# Patient Record
Sex: FEMALE | Race: BLACK OR AFRICAN AMERICAN | NOT HISPANIC OR LATINO | URBAN - METROPOLITAN AREA
[De-identification: names, ages, dates, MRNs, and addresses within clinical notes are randomized per-mention and may not be internally consistent; named-entity substitution may affect disease eponyms.]

---

## 2020-10-31 ENCOUNTER — EMERGENCY (EMERGENCY)
Facility: HOSPITAL | Age: 35
LOS: 1 days | Discharge: HOME | End: 2020-10-31
Attending: INTERNAL MEDICINE | Admitting: INTERNAL MEDICINE
Payer: SELF-PAY

## 2020-10-31 VITALS
TEMPERATURE: 98 F | RESPIRATION RATE: 18 BRPM | OXYGEN SATURATION: 100 % | HEART RATE: 81 BPM | SYSTOLIC BLOOD PRESSURE: 106 MMHG | DIASTOLIC BLOOD PRESSURE: 68 MMHG

## 2020-10-31 PROCEDURE — 72100 X-RAY EXAM L-S SPINE 2/3 VWS: CPT | Mod: 26

## 2020-10-31 PROCEDURE — 72170 X-RAY EXAM OF PELVIS: CPT | Mod: 26

## 2020-10-31 PROCEDURE — 99283 EMERGENCY DEPT VISIT LOW MDM: CPT

## 2020-10-31 RX ORDER — ACETAMINOPHEN 500 MG
975 TABLET ORAL ONCE
Refills: 0 | Status: COMPLETED | OUTPATIENT
Start: 2020-10-31 | End: 2020-10-31

## 2020-10-31 RX ORDER — LIDOCAINE 4 G/100G
1 CREAM TOPICAL ONCE
Refills: 0 | Status: COMPLETED | OUTPATIENT
Start: 2020-10-31 | End: 2020-10-31

## 2020-10-31 RX ADMIN — Medication 975 MILLIGRAM(S): at 13:58

## 2020-10-31 RX ADMIN — LIDOCAINE 1 PATCH: 4 CREAM TOPICAL at 13:58

## 2020-10-31 NOTE — ED PROVIDER NOTE - CRANIAL NERVE AND PUPILLARY EXAM
5/5 strength and intact sensation bilaterally in upper and lower extremity./cranial nerves 2-12 intact

## 2020-10-31 NOTE — ED PROVIDER NOTE - ATTENDING CONTRIBUTION TO CARE
Awake, Alert, Conversant.  Resting comfortably.  Breath sounds clear in all lung fields.  Normal and regular heart rate without murmurs, rubs, or gallops.  Normal S1/S2.  Abdomen soft and nontender.  No lower extremity swelling or tenderness.  Oriented and conversant with fluent speech, moving all extremities with good strength.    Dr. Bassett: I agree with the above provided history and exam and addend/modify it as follows.  34F denies PMHx p/W lower back pain.  No red flags for dangerous etiology of pain.  Plan for analgesia, instructions regarding back care, return precautions and follow-up instructions.    I Stephen Bassett MD performed a history and physical exam of the patient and discussed their management with the resident and /or advanced care provider. I reviewed the resident and /or ACP's note and agree with the documented findings and plan of care. My medical decision making and observations are found above.

## 2020-10-31 NOTE — ED PROVIDER NOTE - PATIENT PORTAL LINK FT
You can access the FollowMyHealth Patient Portal offered by [facility] by registering at the following website: [email or web address]. By joining [facility]’s FollowMyHealth portal, you will also be able to view your health information using other applications (apps) compatible with our system.

## 2020-10-31 NOTE — ED PROVIDER NOTE - OBJECTIVE STATEMENT
35 Y/O F denies PMH or PSH states that she has been low back pain since Thursday. States she was standing up and developed sharp pain in her low back that has been persistent after getting up from a chair. Pt states she took 800MG of Ibuprofen at 1130AM without significant relief. Pt denies numbness, weakness, tingling or bowel or bladder changes. Pt is a non-smoker, denies drug or alcohol use. Pt denies any other symptoms or acute complaints.

## 2020-10-31 NOTE — ED PROVIDER NOTE - PHYSICAL EXAMINATION
Mild tenderness L paraspinal muscle. No ecchymosis or spinous process tenderness. No CVA tenderness bilaterally.

## 2020-10-31 NOTE — ED PROVIDER NOTE - CLINICAL SUMMARY MEDICAL DECISION MAKING FREE TEXT BOX
35 Y/O F denies PMH or PSH states that she has been low back pain since Thursday. States she was standing up and developed sharp pain in her low back that has been persistent after getting up from a chair. Pt is neurologically intact, no bowel or bladder changes. Plan is pain control and X-ray imaging, likely D/C with Orthopedist follow up.

## 2020-10-31 NOTE — ED PROVIDER NOTE - NSFOLLOWUPINSTRUCTIONS_ED_ALL_ED_FT
Follow up with your primary doctor and an Orthopedist. If you do not have an Orthopedist call  to schedule with an Orthopedist. Take Motrin and or Tylenol as needed for pain and you can use a Lidocaine patch as well. Advance activity as tolerated.  Continue all previously prescribed medications as directed.  Follow up with your primary care physician in 48-72 hours- bring copies of your results.  Return to the ER for worsening or persistent symptoms, and/or ANY NEW OR CONCERNING SYMPTOMS. If you have issues obtaining follow up, please call: [phone] to obtain a doctor or specialist who takes your insurance in your area.  You may call [phone] to make an appointment with the internal medicine clinic.

## 2024-11-09 ENCOUNTER — EMERGENCY (EMERGENCY)
Facility: HOSPITAL | Age: 39
LOS: 1 days | Discharge: HOME | End: 2024-11-09
Attending: EMERGENCY MEDICINE | Admitting: EMERGENCY MEDICINE
Payer: COMMERCIAL

## 2024-11-09 VITALS
RESPIRATION RATE: 18 BRPM | DIASTOLIC BLOOD PRESSURE: 65 MMHG | SYSTOLIC BLOOD PRESSURE: 111 MMHG | WEIGHT: 167.99 LBS | HEART RATE: 90 BPM | TEMPERATURE: 98 F | OXYGEN SATURATION: 100 %

## 2024-11-09 VITALS
HEART RATE: 73 BPM | TEMPERATURE: 98 F | SYSTOLIC BLOOD PRESSURE: 111 MMHG | RESPIRATION RATE: 17 BRPM | OXYGEN SATURATION: 100 % | DIASTOLIC BLOOD PRESSURE: 72 MMHG

## 2024-11-09 PROBLEM — Z78.9 OTHER SPECIFIED HEALTH STATUS: Chronic | Status: ACTIVE | Noted: 2020-10-31

## 2024-11-09 LAB
ALBUMIN SERPL ELPH-MCNC: 4 G/DL — SIGNIFICANT CHANGE UP (ref 3.3–5)
ALP SERPL-CCNC: 86 U/L — SIGNIFICANT CHANGE UP (ref 40–120)
ALT FLD-CCNC: 18 U/L — SIGNIFICANT CHANGE UP (ref 4–33)
ANION GAP SERPL CALC-SCNC: 14 MMOL/L — SIGNIFICANT CHANGE UP (ref 7–14)
APPEARANCE UR: CLEAR — SIGNIFICANT CHANGE UP
AST SERPL-CCNC: 19 U/L — SIGNIFICANT CHANGE UP (ref 4–32)
BACTERIA # UR AUTO: ABNORMAL /HPF
BASOPHILS # BLD AUTO: 0.03 K/UL — SIGNIFICANT CHANGE UP (ref 0–0.2)
BASOPHILS NFR BLD AUTO: 0.4 % — SIGNIFICANT CHANGE UP (ref 0–2)
BILIRUB SERPL-MCNC: <0.2 MG/DL — SIGNIFICANT CHANGE UP (ref 0.2–1.2)
BILIRUB UR-MCNC: NEGATIVE — SIGNIFICANT CHANGE UP
BLD GP AB SCN SERPL QL: NEGATIVE — SIGNIFICANT CHANGE UP
BUN SERPL-MCNC: 8 MG/DL — SIGNIFICANT CHANGE UP (ref 7–23)
CALCIUM SERPL-MCNC: 9.3 MG/DL — SIGNIFICANT CHANGE UP (ref 8.4–10.5)
CAST: 0 /LPF — SIGNIFICANT CHANGE UP (ref 0–4)
CHLORIDE SERPL-SCNC: 100 MMOL/L — SIGNIFICANT CHANGE UP (ref 98–107)
CO2 SERPL-SCNC: 20 MMOL/L — LOW (ref 22–31)
COLOR SPEC: YELLOW — SIGNIFICANT CHANGE UP
CREAT SERPL-MCNC: 0.89 MG/DL — SIGNIFICANT CHANGE UP (ref 0.5–1.3)
DIFF PNL FLD: NEGATIVE — SIGNIFICANT CHANGE UP
EGFR: 83 ML/MIN/1.73M2 — SIGNIFICANT CHANGE UP
EOSINOPHIL # BLD AUTO: 0.14 K/UL — SIGNIFICANT CHANGE UP (ref 0–0.5)
EOSINOPHIL NFR BLD AUTO: 1.8 % — SIGNIFICANT CHANGE UP (ref 0–6)
GLUCOSE SERPL-MCNC: 90 MG/DL — SIGNIFICANT CHANGE UP (ref 70–99)
GLUCOSE UR QL: NEGATIVE MG/DL — SIGNIFICANT CHANGE UP
HCG SERPL-ACNC: 7850 MIU/ML — SIGNIFICANT CHANGE UP
HCT VFR BLD CALC: 37.4 % — SIGNIFICANT CHANGE UP (ref 34.5–45)
HGB BLD-MCNC: 11.9 G/DL — SIGNIFICANT CHANGE UP (ref 11.5–15.5)
IANC: 3.8 K/UL — SIGNIFICANT CHANGE UP (ref 1.8–7.4)
IMM GRANULOCYTES NFR BLD AUTO: 0.3 % — SIGNIFICANT CHANGE UP (ref 0–0.9)
KETONES UR-MCNC: NEGATIVE MG/DL — SIGNIFICANT CHANGE UP
LEUKOCYTE ESTERASE UR-ACNC: NEGATIVE — SIGNIFICANT CHANGE UP
LYMPHOCYTES # BLD AUTO: 3.11 K/UL — SIGNIFICANT CHANGE UP (ref 1–3.3)
LYMPHOCYTES # BLD AUTO: 40.6 % — SIGNIFICANT CHANGE UP (ref 13–44)
MCHC RBC-ENTMCNC: 26.9 PG — LOW (ref 27–34)
MCHC RBC-ENTMCNC: 31.8 G/DL — LOW (ref 32–36)
MCV RBC AUTO: 84.6 FL — SIGNIFICANT CHANGE UP (ref 80–100)
MONOCYTES # BLD AUTO: 0.56 K/UL — SIGNIFICANT CHANGE UP (ref 0–0.9)
MONOCYTES NFR BLD AUTO: 7.3 % — SIGNIFICANT CHANGE UP (ref 2–14)
NEUTROPHILS # BLD AUTO: 3.8 K/UL — SIGNIFICANT CHANGE UP (ref 1.8–7.4)
NEUTROPHILS NFR BLD AUTO: 49.6 % — SIGNIFICANT CHANGE UP (ref 43–77)
NITRITE UR-MCNC: NEGATIVE — SIGNIFICANT CHANGE UP
NRBC # BLD: 0 /100 WBCS — SIGNIFICANT CHANGE UP (ref 0–0)
NRBC # FLD: 0 K/UL — SIGNIFICANT CHANGE UP (ref 0–0)
PH UR: 6.5 — SIGNIFICANT CHANGE UP (ref 5–8)
PLATELET # BLD AUTO: 308 K/UL — SIGNIFICANT CHANGE UP (ref 150–400)
POTASSIUM SERPL-MCNC: 4.2 MMOL/L — SIGNIFICANT CHANGE UP (ref 3.5–5.3)
POTASSIUM SERPL-SCNC: 4.2 MMOL/L — SIGNIFICANT CHANGE UP (ref 3.5–5.3)
PROT SERPL-MCNC: 7.9 G/DL — SIGNIFICANT CHANGE UP (ref 6–8.3)
PROT UR-MCNC: NEGATIVE MG/DL — SIGNIFICANT CHANGE UP
RBC # BLD: 4.42 M/UL — SIGNIFICANT CHANGE UP (ref 3.8–5.2)
RBC # FLD: 16.2 % — HIGH (ref 10.3–14.5)
RBC CASTS # UR COMP ASSIST: 4 /HPF — SIGNIFICANT CHANGE UP (ref 0–4)
RH IG SCN BLD-IMP: POSITIVE — SIGNIFICANT CHANGE UP
SODIUM SERPL-SCNC: 134 MMOL/L — LOW (ref 135–145)
SP GR SPEC: 1.02 — SIGNIFICANT CHANGE UP (ref 1–1.03)
SQUAMOUS # UR AUTO: 5 /HPF — SIGNIFICANT CHANGE UP (ref 0–5)
UROBILINOGEN FLD QL: 0.2 MG/DL — SIGNIFICANT CHANGE UP (ref 0.2–1)
WBC # BLD: 7.66 K/UL — SIGNIFICANT CHANGE UP (ref 3.8–10.5)
WBC # FLD AUTO: 7.66 K/UL — SIGNIFICANT CHANGE UP (ref 3.8–10.5)
WBC UR QL: 0 /HPF — SIGNIFICANT CHANGE UP (ref 0–5)

## 2024-11-09 PROCEDURE — 76815 OB US LIMITED FETUS(S): CPT | Mod: 26

## 2024-11-09 PROCEDURE — 99285 EMERGENCY DEPT VISIT HI MDM: CPT

## 2024-11-09 PROCEDURE — 76817 TRANSVAGINAL US OBSTETRIC: CPT | Mod: 26

## 2024-11-09 PROCEDURE — 99284 EMERGENCY DEPT VISIT MOD MDM: CPT | Mod: GC

## 2024-11-09 NOTE — CONSULT NOTE ADULT - ATTENDING COMMENTS
Agree with findings and plan as documented. Pt seen and discussed with the resident. Changes were made as necessary.     40yo  at 5w2d by LMP presenting to ED from outside clinic due to US without IUP. Beta-hcg here above discriminatory zone but no IUP or ectopic pregnancy visualized on US. Review of images and discussion in depth with Radiology attending Dr. Jaycob LAMAS ovarian/adnexal cysts do not appear to be consistent with an ectopic pregnancy, and free fluid appears physiologic/simple. Review of imaging with difficult to appreciate if gestational sac or sacs present. Given this is a highly desired pregnancy and that no ectopic pregnancy visualized, currently with a PUL. Given that findings are benign, will manage expectantly with repeat beta-hcg and ultrasound in 48h.    DIANN Fonseca MD

## 2024-11-09 NOTE — ED PROVIDER NOTE - ABDOMINAL EXAM
Firm areas palpable in the suprapubic area which are likely be patient's fibroids but no significant tenderness./soft/FIRM

## 2024-11-09 NOTE — CONSULT NOTE ADULT - ASSESSMENT
Patient cleared for discharge from a GYN perspective  Will follow up for repeat bHCG and TVUS on Monday 11/11    Pt  seen with Dr. Fonseca  40 yo  LMP 10/3 (@5w2d by LMP) presenting from [facility] due to abnormal US. Here bHCG 7850 with no distinct IUP visualized on US. Structures in endometrium possibly early IUP, but not definitive. LO has a simple cyst, overall not suspicious for ectopic on imaging. Small free fluid, likely physiologic as it is simple in nature. Patient is completely asymptomatic with a benign abdominal exam. This is a highly desired pregnancy. Currently a pregnancy of unknown location.     - Images reviewed in depth with Dr. Fonseca and radiology attending Dr. Devries   - Discussed with patient and partner the differential diagnosis including normal intrauterine pregnancy (one or multiple), abnormal intrauterine pregnancy, or ectopic pregnancy. Discussed the reasons that make us concerned for ectopic pregnancy including the elevated bHCG, and the reassuring features including her normal ultrasound and the fact that she is asymptomatic. Discussed there is ultimately no way to know for sure unless diagnostic laparoscopy is performed with or without D&C. However, in a highly desired pregnancy this would likely be pregnancy ending if the pregnancy is in the uterus.   - Discussed the risks of ectopic pregnancy including life threatening intra-abdominal hemorrhage.   - Reviewed the management options including expectant management with serial bHCG, surgical management, and medical management with methotrexate.   - The patient and  would like to proceed with serial bHCG monitoring as this is a highly desired pregnancy. They state that they understand the risk of possible ectopic pregnancy, and that an ectopic pregnancy can be a life threatening condition.   - Return precautions reviewed in depth with both patient and   - Pt is Rh+ and without vaginal bleeding, no Rhogam indicated   - Patient cleared for discharge from a GYN perspective  - Will follow up for repeat bHCG and TVUS on  in the ED     Mahnaz Tilley, PGY4  Pt  seen with Dr. Fonseca

## 2024-11-09 NOTE — ED PROVIDER NOTE - OBJECTIVE STATEMENT
39-year-old female with past medical history of fibroids.  Patient is a G2, P0 with LMP of October 3.  Patient presents ED now after no IUP noted yesterday.  [facility] done.  No IUP was noted at that time and today they called her stating beta was high and she should be seen in the emergency room immediately.  Patient denies any vaginal bleeding or lower abdominal pain that is different from her chronic fibroid issues.  Previous termination was performed medically without any surgical intervention.

## 2024-11-09 NOTE — ED ADULT NURSE NOTE - OBJECTIVE STATEMENT
A&Ox4. ambulatory. c/o sent by PCP for inability to see fetus. PT states she is 5 weeks pregnant. NAD. pt denies SOB, chest pain, dizziness, weakness, urinary symptoms, HA, n/v/d, fevers, chills, pain, vaginal bleeding. respirations are even and un labored. respirations are even and un labored. skin intact. 20g placed to LAC. labs drawn and sent. safety precautions maintained.

## 2024-11-09 NOTE — CONSULT NOTE ADULT - SUBJECTIVE AND OBJECTIVE BOX
CAROLINE MINER  39y  Female [id number]    HPI:  40 yo  LMP 10/3 (@5w2d by LMP) presenting from [facility] due to abnormal US. Patient was seen earlier in the week at [facility] to establish prenatal care. US and bHCG were performed. Patient was called today and instructed to present to emergency room due to elevated bHCG but no IUP seen on US. Patient is unsure what bHCG at [facility] was. This is a highly desired pregnancy.     Patient denies vaginal bleeding, pain, dizziness, shortness of breath, palpitations.     [facility] ()    Name of GYN Physician: [facility]     Past OB:    TOP x1, med AB     Past gyn: Endorses known hx of fibroids. Denies cysts, endometriosis, STI's, Abnormal pap smears     Home meds: Minoxidil (stopped with positive pregnancy test), PNV    Allergies: No Known Allergies    PAST MEDICAL & SURGICAL HISTORY:  - No pertinent past medical history  - No significant past surgical history    Social History:  Denies smoking use, drug use, alcohol use.      Vital Signs Last 24 Hrs  T(C): 36.7 (2024 16:26), Max: 36.9 (2024 11:31)  T(F): 98.1 (2024 16:26), Max: 98.4 (2024 11:31)  HR: 69 (2024 16:26) (69 - 90)  BP: 109/75 (2024 16:26) (109/75 - 111/65)  BP(mean): --  RR: 17 (2024 16:26) (17 - 18)  SpO2: 100% (2024 16:26) (100% - 100%)    Parameters below as of 2024 16:26  Patient On (Oxygen Delivery Method): room air        Physical Exam:   General: sitting comfortably in bed, NAD   Abd: Soft, non-tender, non-distended.    Ext: non-tender b/l, no edema     LABS:                              11.9   7.66  )-----------( 308      ( 2024 13:08 )             37.4     11-09    134[L]  |  100  |  8   ----------------------------<  90  4.2   |  20[L]  |  0.89    Ca    9.3      2024 13:08    TPro  7.9  /  Alb  4.0  /  TBili  <0.2  /  DBili  x   /  AST  19  /  ALT  18  /  AlkPhos  86      I&O's Detail      Urinalysis Basic - ( 2024 18:29 )    Color: Yellow / Appearance: Clear / S.020 / pH: x  Gluc: x / Ketone: Negative mg/dL  / Bili: Negative / Urobili: 0.2 mg/dL   Blood: x / Protein: Negative mg/dL / Nitrite: Negative   Leuk Esterase: Negative / RBC: 4 /HPF / WBC 0 /HPF   Sq Epi: x / Non Sq Epi: 5 /HPF / Bacteria: Occasional /HPF    HCG Quantitative, Serum: 7850.0:    RADIOLOGY & ADDITIONAL STUDIES:    < from: US Pelvis Complete (24 @ 16:00) >  US PELVIC COMPLETE   ORDERED BY: YONG AKINS     ACC: [id number] EXAM:  US OB TRANSVAGINAL   ORDERED BY: YONG AKINS     PROCEDURE DATE:  2024          INTERPRETATION:  CLINICAL INFORMATION: Elevated beta hCG. Evaluate for   ectopic pregnancy. History of fibroids.    LMP: 10/03/2024    Estimated Gestational Age by LMP: 5 weeks 2 days.    COMPARISON: None available.    Endovaginal and transabdominal pelvic sonogram. Color and Spectral   Doppler was performed.    FINDINGS:  Uterus: 14.1 x 9.4 x 8.5 cm. Heterogeneous echotexture of the uterine   myometrium identified with lobulation of the uterine contour noted. There   are multiple space-occupying lesions identified within the uterine   myometrium measuring up to 4.9 x 4.9 x 4.8 cm, most consistent with   uterine myomas.    Endometrial thickness approximates 20 mm.    There is a complex cystic structure identified within the endometrial   region measuring approximately 8.4 x 8.3 x 5.0 mm, indeterminate.    Right ovary: 3.7 cm x 1.8 cm x 2.6 cm. Within normal limits. Blood flow   identified within the right ovarian parenchyma.  Left ovary: 4.4 cm x 3.3 cm x 4.0 cm. 3.6 x 3.2 x 3.0 cm cyst.Blood flow   identified within the left ovarian parenchyma.  Left adnexa:Medial to the left ovary there is an additional cystic   structure measuring 1.0 x 0.7 x 0.7 cm, indeterminate clinical   significance.    Fluid: Fluid identified within the pelvic cul-de-sac region.    IMPRESSION:  Neither an intrauterine nor extrauterine gestation is identified. This   represents a pregnancy of indeterminate location. Differential diagnosis   includes early normal IUP, pregnancy failure or ectopic pregnancy. Serial   hCG and ultrasound are recommended to determine the significance of these   findings. Fluid identified within the pelvic cul-de-sac region.   Heterogeneous echotexture of the uterine myometrium identified with   lobulation of the uterine contour noted. There are multiple   space-occupying lesions identified within the uterine myometrium   measuring up to 4.9 x 4.9 x 4.8 cm, most consistent with uterine myomas.        --- End of Report ---          < end of copied text >

## 2024-11-09 NOTE — ED ADULT TRIAGE NOTE - CHIEF COMPLAINT QUOTE
Pt presents to ED ambulatory from home with c/o abnormal labs. Pt had positive pregnancy test yesterday, elevated BHCg level but was unable to see pregnancy on ultrasound. Pt reports LMP 10/, Pt denies vaginal bleeding, endorses abdominal cramping like pain and urinary frequency. Pt reports hx of uterine fibroids, .

## 2024-11-09 NOTE — ED PROVIDER NOTE - PATIENT PORTAL LINK FT
You can access the FollowMyHealth Patient Portal offered by [facility] by registering at the following website: [email or web address]. By joining [facility]’s FollowMyHealth portal, you will also be able to view your health information using other applications (apps) compatible with our system.

## 2024-11-09 NOTE — ED PROVIDER NOTE - CLINICAL SUMMARY MEDICAL DECISION MAKING FREE TEXT BOX
39-year-old female with with history of fibroids at 5 weeks 2 days by LMP presenting to the ED with very [facility] and no LMP yesterday.  Will repeat beta now and get transvaginal ultrasound.  Patient is suprapubic tenderness but this may be due to fibroids.  Patient is not complaining of pain will hold off on pain medication.  Will call GYN if no IUP is noted. Patient has no private GYN that she sees consistently.

## 2024-11-11 ENCOUNTER — EMERGENCY (EMERGENCY)
Facility: HOSPITAL | Age: 39
LOS: 1 days | Discharge: HOME | End: 2024-11-11
Admitting: STUDENT IN AN ORGANIZED HEALTH CARE EDUCATION/TRAINING PROGRAM
Payer: COMMERCIAL

## 2024-11-11 VITALS
OXYGEN SATURATION: 97 % | DIASTOLIC BLOOD PRESSURE: 74 MMHG | RESPIRATION RATE: 18 BRPM | HEART RATE: 70 BPM | SYSTOLIC BLOOD PRESSURE: 110 MMHG | WEIGHT: 167.99 LBS | HEIGHT: 62 IN | TEMPERATURE: 98 F

## 2024-11-11 LAB
ALBUMIN SERPL ELPH-MCNC: 4.2 G/DL — SIGNIFICANT CHANGE UP (ref 3.3–5)
ALP SERPL-CCNC: 87 U/L — SIGNIFICANT CHANGE UP (ref 40–120)
ALT FLD-CCNC: 17 U/L — SIGNIFICANT CHANGE UP (ref 4–33)
ANION GAP SERPL CALC-SCNC: 14 MMOL/L — SIGNIFICANT CHANGE UP (ref 7–14)
APPEARANCE UR: CLEAR — SIGNIFICANT CHANGE UP
AST SERPL-CCNC: 17 U/L — SIGNIFICANT CHANGE UP (ref 4–32)
BASOPHILS # BLD AUTO: 0.04 K/UL — SIGNIFICANT CHANGE UP (ref 0–0.2)
BASOPHILS NFR BLD AUTO: 0.5 % — SIGNIFICANT CHANGE UP (ref 0–2)
BILIRUB SERPL-MCNC: <0.2 MG/DL — SIGNIFICANT CHANGE UP (ref 0.2–1.2)
BILIRUB UR-MCNC: NEGATIVE — SIGNIFICANT CHANGE UP
BLD GP AB SCN SERPL QL: NEGATIVE — SIGNIFICANT CHANGE UP
BUN SERPL-MCNC: 8 MG/DL — SIGNIFICANT CHANGE UP (ref 7–23)
CALCIUM SERPL-MCNC: 9.5 MG/DL — SIGNIFICANT CHANGE UP (ref 8.4–10.5)
CHLORIDE SERPL-SCNC: 99 MMOL/L — SIGNIFICANT CHANGE UP (ref 98–107)
CO2 SERPL-SCNC: 22 MMOL/L — SIGNIFICANT CHANGE UP (ref 22–31)
COLOR SPEC: YELLOW — SIGNIFICANT CHANGE UP
CREAT SERPL-MCNC: 0.84 MG/DL — SIGNIFICANT CHANGE UP (ref 0.5–1.3)
DIFF PNL FLD: NEGATIVE — SIGNIFICANT CHANGE UP
EGFR: 89 ML/MIN/1.73M2 — SIGNIFICANT CHANGE UP
EOSINOPHIL # BLD AUTO: 0.13 K/UL — SIGNIFICANT CHANGE UP (ref 0–0.5)
EOSINOPHIL NFR BLD AUTO: 1.5 % — SIGNIFICANT CHANGE UP (ref 0–6)
GLUCOSE SERPL-MCNC: 97 MG/DL — SIGNIFICANT CHANGE UP (ref 70–99)
GLUCOSE UR QL: NEGATIVE MG/DL — SIGNIFICANT CHANGE UP
HCG SERPL-ACNC: SIGNIFICANT CHANGE UP MIU/ML
HCT VFR BLD CALC: 36.9 % — SIGNIFICANT CHANGE UP (ref 34.5–45)
HGB BLD-MCNC: 11.8 G/DL — SIGNIFICANT CHANGE UP (ref 11.5–15.5)
IANC: 4.35 K/UL — SIGNIFICANT CHANGE UP (ref 1.8–7.4)
IMM GRANULOCYTES NFR BLD AUTO: 0.1 % — SIGNIFICANT CHANGE UP (ref 0–0.9)
KETONES UR-MCNC: NEGATIVE MG/DL — SIGNIFICANT CHANGE UP
LEUKOCYTE ESTERASE UR-ACNC: NEGATIVE — SIGNIFICANT CHANGE UP
LYMPHOCYTES # BLD AUTO: 3.6 K/UL — HIGH (ref 1–3.3)
LYMPHOCYTES # BLD AUTO: 40.9 % — SIGNIFICANT CHANGE UP (ref 13–44)
MCHC RBC-ENTMCNC: 27.1 PG — SIGNIFICANT CHANGE UP (ref 27–34)
MCHC RBC-ENTMCNC: 32 G/DL — SIGNIFICANT CHANGE UP (ref 32–36)
MCV RBC AUTO: 84.8 FL — SIGNIFICANT CHANGE UP (ref 80–100)
MONOCYTES # BLD AUTO: 0.67 K/UL — SIGNIFICANT CHANGE UP (ref 0–0.9)
MONOCYTES NFR BLD AUTO: 7.6 % — SIGNIFICANT CHANGE UP (ref 2–14)
NEUTROPHILS # BLD AUTO: 4.35 K/UL — SIGNIFICANT CHANGE UP (ref 1.8–7.4)
NEUTROPHILS NFR BLD AUTO: 49.4 % — SIGNIFICANT CHANGE UP (ref 43–77)
NITRITE UR-MCNC: NEGATIVE — SIGNIFICANT CHANGE UP
NRBC # BLD: 0 /100 WBCS — SIGNIFICANT CHANGE UP (ref 0–0)
NRBC # FLD: 0 K/UL — SIGNIFICANT CHANGE UP (ref 0–0)
PH UR: 6 — SIGNIFICANT CHANGE UP (ref 5–8)
PLATELET # BLD AUTO: 317 K/UL — SIGNIFICANT CHANGE UP (ref 150–400)
POTASSIUM SERPL-MCNC: 3.8 MMOL/L — SIGNIFICANT CHANGE UP (ref 3.5–5.3)
POTASSIUM SERPL-SCNC: 3.8 MMOL/L — SIGNIFICANT CHANGE UP (ref 3.5–5.3)
PROT SERPL-MCNC: 8.1 G/DL — SIGNIFICANT CHANGE UP (ref 6–8.3)
PROT UR-MCNC: NEGATIVE MG/DL — SIGNIFICANT CHANGE UP
RBC # BLD: 4.35 M/UL — SIGNIFICANT CHANGE UP (ref 3.8–5.2)
RBC # FLD: 16 % — HIGH (ref 10.3–14.5)
RH IG SCN BLD-IMP: POSITIVE — SIGNIFICANT CHANGE UP
SODIUM SERPL-SCNC: 135 MMOL/L — SIGNIFICANT CHANGE UP (ref 135–145)
SP GR SPEC: 1.02 — SIGNIFICANT CHANGE UP (ref 1–1.03)
UROBILINOGEN FLD QL: 0.2 MG/DL — SIGNIFICANT CHANGE UP (ref 0.2–1)
WBC # BLD: 8.8 K/UL — SIGNIFICANT CHANGE UP (ref 3.8–10.5)
WBC # FLD AUTO: 8.8 K/UL — SIGNIFICANT CHANGE UP (ref 3.8–10.5)

## 2024-11-11 PROCEDURE — 76817 TRANSVAGINAL US OBSTETRIC: CPT | Mod: 26

## 2024-11-11 PROCEDURE — 99285 EMERGENCY DEPT VISIT HI MDM: CPT

## 2024-11-11 PROCEDURE — 99284 EMERGENCY DEPT VISIT MOD MDM: CPT

## 2024-11-11 RX ORDER — ACETAMINOPHEN 500 MG
650 TABLET ORAL ONCE
Refills: 0 | Status: COMPLETED | OUTPATIENT
Start: 2024-11-11 | End: 2024-11-11

## 2024-11-11 RX ADMIN — Medication 650 MILLIGRAM(S): at 21:54

## 2024-11-11 RX ADMIN — Medication 650 MILLIGRAM(S): at 21:12

## 2024-11-11 NOTE — ED ADULT NURSE NOTE - OBJECTIVE STATEMENT
39 y female with past medical history of  fibroids presenting to ED  for follow up HCG and Sono studies reports tested positive on pregnancy test but no viable pregnancy was found on Sono. Pt denies any pain, SOB, abdominal pain, vaginal bleeding. 20g IV placed L AC, labs obtained and sent to lab 39 y female with past medical history of  fibroids presenting to ED  for follow up HCG and Sono studies reports tested positive on pregnancy test but no viable pregnancy was found on Sono. Pt denies SOB, , vaginal bleeding. reported abdominal pain. 20g IV placed L AC, labs obtained and sent to lab

## 2024-11-11 NOTE — ED PROVIDER NOTE - NSFOLLOWUPINSTRUCTIONS_ED_ALL_ED_FT
There are no signs of emergency conditions requiring admission to the hospital on today's workup.  Based on the evaluation, a presumptive diagnosis was made, however, further evaluation may be required by your primary care physician or a specialist for a more definitive diagnosis.  Therefore, please follow-up as directed or return to the Emergency Department if your symptoms change or worsen.    We recommend that you:  Return to the ED in 48 hours to repeat your labs and ultrasound.        *** Return immediately if you have worsening symptoms, chest pain, Shortness of Breath, abdominal pain, Nausea/Vomiting/Diarrhea, dizziness, weakness, confusion, severe headache, vision changes, urinary symptoms, syncope, falls, trauma, discharge, bleeding, fevers, or any other new/concerning symptoms. ***

## 2024-11-11 NOTE — ED ADULT TRIAGE NOTE - CHIEF COMPLAINT QUOTE
here for follow up HCG and sono studies reports tested positive on pregnancy test but no viable pregnancy was found on Sono studies 2 days ago. Denies Phx.

## 2024-11-11 NOTE — ED PROVIDER NOTE - CLINICAL SUMMARY MEDICAL DECISION MAKING FREE TEXT BOX
39-year-old female  LMP 10/3 presenting to the ER for repeat beta and ultrasound.  Patient reports she has been having intermittent pain on the left lower abdomen which has been ongoing for over 1 week.  Patient was seen in the ED on  was found to have a beta level of 7850, ultrasound did not confirm IUP.  Patient was seen by GYN and recommended to return to the ER in 48 hours for beta-hCG level checked NP transvaginal ultrasound.  No fever, vaginal bleeding, vaginal discharge, dizziness, weakness. On exam pt is well appearing, vitals within normal, non tender abd, gu exam deferred to obgyn. Concern for ectopic vs early gestation. Plan: cbc/cmp, hcg, TVUS, ua/ucx (pt does report urinary frequency but not dysuria, will r/o UTI)

## 2024-11-11 NOTE — ED PROVIDER NOTE - OBJECTIVE STATEMENT
no 39-year-old female  LMP 10/3 presenting to the ER for repeat beta and ultrasound.  Patient reports she has been having intermittent pain on the left lower abdomen which has been ongoing for over 1 week.  Patient was seen in the ED on  was found to have a beta level of 7850, ultrasound did not confirm IUP.  Patient was seen by GYN and recommended to return to the ER in 48 hours for beta-hCG level checked NP transvaginal ultrasound.  Patient denies fever/chills, vaginal bleeding, vaginal discharge, nausea, vomiting, diarrhea, CP, SOB, dizziness, weakness or any other concerns.

## 2024-11-11 NOTE — ED PROVIDER NOTE - PROGRESS NOTE DETAILS
BREE Pathak: Spoke with GYN will see pt in the ED. BREE Cortez:  Patient received at signout pending transvaginal ultrasound.  Transvaginal ultrasound reviewed significant for single IUP but embryonic pole is not yet detected .Following OB eval recommending repeat BHcg and US in 2 days to reassess. This was d/w patient who verbalizes understanding and agrees with plan. Discussed signs/symptoms warranting return, all questions answered, she verbalizes understanding.

## 2024-11-11 NOTE — ED PROVIDER NOTE - PATIENT PORTAL LINK FT
You can access the FollowMyHealth Patient Portal offered by [facility] by registering at the following website: [email or web address]. By joining [facility]’s FollowMyHealth portal, you will also be able to view your health information using other applications (apps) compatible with our system.

## 2024-11-12 VITALS
OXYGEN SATURATION: 98 % | HEART RATE: 77 BPM | TEMPERATURE: 98 F | SYSTOLIC BLOOD PRESSURE: 105 MMHG | DIASTOLIC BLOOD PRESSURE: 66 MMHG | RESPIRATION RATE: 18 BRPM

## 2024-11-12 NOTE — CONSULT NOTE ADULT - SUBJECTIVE AND OBJECTIVE BOX
CAROLINE MINER  39y  Female [id number]    HPI:  38 yo  LMP 10/3 (@5w4d by LMP) presenting for repeat bHCG in the setting of PUL. Patient initially presented on  from [facility] due to abnormal US. Patient was seen earlier in the week at [facility] to establish prenatal care. US and bHCG were performed. Patient was called today and instructed to present to emergency room due to elevated bHCG but no IUP seen on US. Patient is unsure what bHCG at [facility] was. This is a highly desired pregnancy.     Patient is endorsing LLQ pain that she describes as "pressure" rather than pain. Patient denies vaginal bleeding, pain, dizziness, shortness of breath, palpitations.     bHC ()->60426 ()    Name of GYN Physician: Cande OBGYALIZE     Past OB:    TOP x1, med AB     Past gyn: Endorses known hx of fibroids. Denies cysts, endometriosis, STI's, Abnormal pap smears     Home meds: Minoxidil (stopped with positive pregnancy test), PNV    Allergies: No Known Allergies    PAST MEDICAL & SURGICAL HISTORY:  - No pertinent past medical history  - No significant past surgical history    Social History:  Denies smoking use, drug use, alcohol use.        Vital Signs Last 24 Hrs  T(C): 36.5 (2024 23:16), Max: 36.9 (2024 19:05)  T(F): 97.7 (2024 23:16), Max: 98.4 (2024 19:05)  HR: 76 (2024 23:16) (70 - 76)  BP: 103/53 (2024 23:16) (103/53 - 110/74)  BP(mean): --  RR: 19 (2024 23:16) (18 - 19)  SpO2: 99% (2024 23:16) (97% - 99%)    Parameters below as of 2024 19:05  Patient On (Oxygen Delivery Method): room air        Physical Exam:   General: sitting comfortably in bed, NAD   HEENT: neck supple, full ROM  CV: well perfused  Lungs: nonlabored respirations  Abd: Soft, non-tender, non-distended.   :  No bleeding on pad.      LABS:                 11.8   8.80  )-----------( 317      ( 2024 20:17 )             36.9         135  |  99  |  8   ----------------------------<  97  3.8   |  22  |  0.84    Ca    9.5      2024 20:17    TPro  8.1  /  Alb  4.2  /  TBili  <0.2  /  DBili  x   /  AST  17  /  ALT  17  /  AlkPhos  87      I&O's Detail      Urinalysis Basic - ( 2024 20:30 )    Color: Yellow / Appearance: Clear / S.017 / pH: x  Gluc: x / Ketone: Negative mg/dL  / Bili: Negative / Urobili: 0.2 mg/dL   Blood: x / Protein: Negative mg/dL / Nitrite: Negative   Leuk Esterase: Negative / RBC: x / WBC x   Sq Epi: x / Non Sq Epi: x / Bacteria: x        RADIOLOGY & ADDITIONAL STUDIES:    ACC: [id number] EXAM:  US OB TRANSVAGINAL   ORDERED BY: EDDI MEZA     PROCEDURE DATE:  2024          INTERPRETATION:  CLINICAL INFORMATION: Elevated beta hCG    LMP: 10/3/2024    Estimated Gestational Age by LMP: 5 weeks and 4 days    COMPARISON: Pelvic ultrasound 2024    Endovaginal pelvic sonogram as per order. Transabdominal pelvic sonogram   was also performed as part of our standard protocol. Color and Spectral   Doppler was performed.    FINDINGS:  Uterus: Enlarged fibroid uterus.    There is a single intrauterine gestational sac with internal echogenic   material, possibly an early embryonic pole. Tiny perigestational   subchorionic hematoma.    Gestational Sac Size (mean): 1.1 cm  Gestational Sac Shape : Normal.  Crown Rump Length: Not measured  Estimated Gestational Age: 5 weeks and 5 days by mean gestational sac   size.  Yolk Sac: Not visualized    Right ovary: 1.7 cm x 2.5 cm x 1.5 cm. Within normal limits. Normal   arterial and venous waveforms.  Left ovary: 3.8 cm x 4.1 x 4.4 cm, inclusive of a 3.5 cm cyst Normal   arterial and venous waveforms.    Fluid: Small volume free fluid seen within the posterior cul-de-sac    IMPRESSION:  Single intrauterine gestational sac. Embryonic pole is not yet clearly   visualized.  Size compatible with menstrual dates.    --- End of Report ---          STACIE KLINE MD; Resident Radiologist  This document has been electronically signed.  JOANIE DOBBINS MD; Attending Radiologist  This document has been electronically signed. 2024 12:45AM   CAROLINE MINER  39y  Female [id number]    HPI:  38 yo  LMP 10/3 (@5w4d by LMP) presenting for repeat bHCG in the setting of PUL. Patient initially presented on  from [facility] O[facility] due to abnormal US. Patient was seen earlier in the week at [facility] to establish prenatal care. At that time, US and bHCG were performed and patient was instructed to present to emergency room due to elevated bHCG but no IUP seen on US. Patient is unsure what bHCG at [facility] was. This is a highly desired pregnancy.  Patient is endorsing LLQ pain that she describes as "pressure" rather than pain. Patient denies vaginal bleeding, pain, dizziness, shortness of breath, palpitations.     bHC ()->62009 ()    Name of GYN Physician: Cande OBGYALIZE     Past OB:    TOP x1, med AB     Past gyn: Endorses known hx of fibroids. Denies cysts, endometriosis, STI's, Abnormal pap smears     Home meds: Minoxidil (stopped with positive pregnancy test), PNV    Allergies: No Known Allergies    PAST MEDICAL & SURGICAL HISTORY:  - No pertinent past medical history  - No significant past surgical history    Social History:  Denies smoking use, drug use, alcohol use.        Vital Signs Last 24 Hrs  T(C): 36.5 (2024 23:16), Max: 36.9 (2024 19:05)  T(F): 97.7 (2024 23:16), Max: 98.4 (2024 19:05)  HR: 76 (2024 23:16) (70 - 76)  BP: 103/53 (2024 23:16) (103/53 - 110/74)  BP(mean): --  RR: 19 (2024 23:16) (18 - 19)  SpO2: 99% (2024 23:16) (97% - 99%)    Parameters below as of 2024 19:05  Patient On (Oxygen Delivery Method): room air        Physical Exam:   General: sitting comfortably in bed, NAD   HEENT: neck supple, full ROM  CV: well perfused  Lungs: nonlabored respirations  Abd: Soft, non-tender, non-distended.   :  No bleeding on pad.      LABS:                 11.8   8.80  )-----------( 317      ( 2024 20:17 )             36.9         135  |  99  |  8   ----------------------------<  97  3.8   |  22  |  0.84    Ca    9.5      2024 20:17    TPro  8.1  /  Alb  4.2  /  TBili  <0.2  /  DBili  x   /  AST  17  /  ALT  17  /  AlkPhos  87      I&O's Detail      Urinalysis Basic - ( 2024 20:30 )    Color: Yellow / Appearance: Clear / S.017 / pH: x  Gluc: x / Ketone: Negative mg/dL  / Bili: Negative / Urobili: 0.2 mg/dL   Blood: x / Protein: Negative mg/dL / Nitrite: Negative   Leuk Esterase: Negative / RBC: x / WBC x   Sq Epi: x / Non Sq Epi: x / Bacteria: x        RADIOLOGY & ADDITIONAL STUDIES:    ACC: [id number] EXAM:  US OB TRANSVAGINAL   ORDERED BY: EDDI MEZA     PROCEDURE DATE:  2024          INTERPRETATION:  CLINICAL INFORMATION: Elevated beta hCG    LMP: 10/3/2024    Estimated Gestational Age by LMP: 5 weeks and 4 days    COMPARISON: Pelvic ultrasound 2024    Endovaginal pelvic sonogram as per order. Transabdominal pelvic sonogram   was also performed as part of our standard protocol. Color and Spectral   Doppler was performed.    FINDINGS:  Uterus: Enlarged fibroid uterus.    There is a single intrauterine gestational sac with internal echogenic   material, possibly an early embryonic pole. Tiny perigestational   subchorionic hematoma.    Gestational Sac Size (mean): 1.1 cm  Gestational Sac Shape : Normal.  Crown Rump Length: Not measured  Estimated Gestational Age: 5 weeks and 5 days by mean gestational sac   size.  Yolk Sac: Not visualized    Right ovary: 1.7 cm x 2.5 cm x 1.5 cm. Within normal limits. Normal   arterial and venous waveforms.  Left ovary: 3.8 cm x 4.1 x 4.4 cm, inclusive of a 3.5 cm cyst Normal   arterial and venous waveforms.    Fluid: Small volume free fluid seen within the posterior cul-de-sac    IMPRESSION:  Single intrauterine gestational sac. Embryonic pole is not yet clearly   visualized.  Size compatible with menstrual dates.    --- End of Report ---          STACIE KLINE MD; Resident Radiologist  This document has been electronically signed.  JOANIE DOBBINS MD; Attending Radiologist  This document has been electronically signed. 2024 12:45AM

## 2024-11-12 NOTE — CONSULT NOTE ADULT - ASSESSMENT
40 yo  LMP 10/3 (@5w4d by LMP) presenting for repeat bHCG in the setting of PUL. bHCG uptrendign appropriately Here bHCG 7850 ()->01517 (). TVUS with GS but no YS or FP as determined by radiology. LO has a simple cyst, overall not suspicious for ectopic on imaging. Small free fluid, likely physiologic as it is simple in nature. Patient w/ RLQ pressure with a benign abdominal exam. This is a highly desired pregnancy. Currently a pregnancy of unknown location.     - Pt to follow up in 48 hours for repeat bHCG/TVUS in the ED.   - Discussed with patient and partner the differential diagnosis including normal intrauterine pregnancy (one or multiple), abnormal intrauterine pregnancy, or ectopic pregnancy. Discussed the reasons that make us concerned for ectopic pregnancy including the elevated bHCG, and the reassuring features including her normal ultrasound and the fact that she is asymptomatic. Discussed there is ultimately no way to know for sure unless diagnostic laparoscopy is performed with or without D&C. However, in a highly desired pregnancy this would likely be pregnancy ending if the pregnancy is in the uterus.   - Discussed the risks of ectopic pregnancy including life threatening intra-abdominal hemorrhage.   - Return precautions reviewed with patient  - Pt is Rh+ and without vaginal bleeding, no Rhogam indicated   - Patient cleared for discharge from a GYN perspective    D/w Dr. Raymundo Garcia PGY2

## 2024-11-12 NOTE — CONSULT NOTE ADULT - ATTENDING COMMENTS
Pt discussed with the resident. Agree with findings and plan as documented.    38yo  5.4wga by LMP of 10/3 presenting for f/u PUL. Beta-hCG uptrended and above discriminatory zone. Gestational sac visualized. Mild pelvic pressure noted.     40 yo  LMP 10/3 (@5w4d by LMP) presenting for repeat bHCG in the setting of PUL. bHCG uptrendign appropriately Here bHCG 7850 ()->54537 (). TVUS with GS but no YS or FP as determined by radiology. LO has a simple cyst, overall not suspicious for ectopic on imaging. Small free fluid, likely physiologic as it is simple in nature. Patient w/ RLQ pressure with a benign abdominal exam. This is a highly desired pregnancy. Currently a pregnancy of unknown location.     - Pt to follow up in 48 hours for repeat bHCG/TVUS in the ED.   - Discussed with patient and partner the differential diagnosis including normal intrauterine pregnancy (one or multiple), abnormal intrauterine pregnancy, or ectopic pregnancy. Discussed the reasons that make us concerned for ectopic pregnancy including the elevated bHCG, and the reassuring features including her normal ultrasound and the fact that she is asymptomatic. Discussed there is ultimately no way to know for sure unless diagnostic laparoscopy is performed with or without D&C. However, in a highly desired pregnancy this would likely be pregnancy ending if the pregnancy is in the uterus.   - Discussed the risks of ectopic pregnancy including life threatening intra-abdominal hemorrhage.   - Return precautions reviewed with patient  - Pt is Rh+ and without vaginal bleeding, no Rhogam indicated   - Patient cleared for discharge from a GYN perspective    D/w Dr. Raymundo Garcia PGY2 Pt discussed with the resident. Agree with findings and plan as documented.    40yo  5.4wga by LMP of 10/3 presenting for f/u PUL. Beta-hCG uptrended and above discriminatory zone. Mild pelvic pressure noted.   Given beta-hCG above discriminatory zone suspect gestational sac moreso than pseuodogestational sac which would be consisent with IUP. Upon review of images, unclear if embryonic pole visualized. Cannot definitively rule out ectopic at this time so still a PUL. However, exam benign at this time. Of note, would expect to see a embryo with cardiac activity by 2 weeks. Additionally, fibroid uterus noted. Recommend follow up 48h with ultrasound and beta-hCG. Ok for dc from GYN standpoint.    DIANN Fonseca MD

## 2024-11-13 LAB
CULTURE RESULTS: SIGNIFICANT CHANGE UP
SPECIMEN SOURCE: SIGNIFICANT CHANGE UP

## 2024-11-15 ENCOUNTER — EMERGENCY (EMERGENCY)
Facility: HOSPITAL | Age: 39
LOS: 1 days | Discharge: HOME | End: 2024-11-15
Admitting: EMERGENCY MEDICINE
Payer: COMMERCIAL

## 2024-11-15 VITALS
SYSTOLIC BLOOD PRESSURE: 111 MMHG | DIASTOLIC BLOOD PRESSURE: 74 MMHG | OXYGEN SATURATION: 100 % | RESPIRATION RATE: 16 BRPM | WEIGHT: 167.99 LBS | TEMPERATURE: 98 F | HEIGHT: 62 IN | HEART RATE: 73 BPM

## 2024-11-15 PROCEDURE — 99285 EMERGENCY DEPT VISIT HI MDM: CPT

## 2024-11-15 NOTE — CHART NOTE - NSCHARTNOTEFT_GEN_A_CORE
Called and left message for patient. Patient advised at most recent ED visit to have repeat bHCG drawn 11/13 to follow pregnancy of unknown location. Patient did not answer, left message encouraging follow up.    BREE Frey  d/w GYN team
Spoke to patient, states will come to ED today for repeat bHCG. Pt states she does not currently have any questions.    GYN Team aware  Jared Brown PGY1
Spoke to patient, states will come to ED tomorrow for repeat bHCG. Reports lower abdominal discomfort at night that subsides. Denies severe pain or vaginal bleeding.     BREE Frey  d/w GYN team

## 2024-11-15 NOTE — ED ADULT TRIAGE NOTE - CHIEF COMPLAINT QUOTE
pt complains of vaginal spotting @ 2130 approx. 6 weeks pregnant not confirmed by US . pt denies changes in vision, chest pain, headache, nausea, dizziness, vomiting,  SOB, fever, and  chills. pt past medical hx fibroids  .

## 2024-11-16 LAB
ALBUMIN SERPL ELPH-MCNC: 4 G/DL — SIGNIFICANT CHANGE UP (ref 3.3–5)
ALP SERPL-CCNC: 82 U/L — SIGNIFICANT CHANGE UP (ref 40–120)
ALT FLD-CCNC: 13 U/L — SIGNIFICANT CHANGE UP (ref 4–33)
ANION GAP SERPL CALC-SCNC: 12 MMOL/L — SIGNIFICANT CHANGE UP (ref 7–14)
APPEARANCE UR: ABNORMAL
AST SERPL-CCNC: 15 U/L — SIGNIFICANT CHANGE UP (ref 4–32)
BACTERIA # UR AUTO: ABNORMAL /HPF
BASOPHILS # BLD AUTO: 0.04 K/UL — SIGNIFICANT CHANGE UP (ref 0–0.2)
BASOPHILS NFR BLD AUTO: 0.5 % — SIGNIFICANT CHANGE UP (ref 0–2)
BILIRUB SERPL-MCNC: <0.2 MG/DL — SIGNIFICANT CHANGE UP (ref 0.2–1.2)
BILIRUB UR-MCNC: NEGATIVE — SIGNIFICANT CHANGE UP
BLD GP AB SCN SERPL QL: NEGATIVE — SIGNIFICANT CHANGE UP
BUN SERPL-MCNC: 8 MG/DL — SIGNIFICANT CHANGE UP (ref 7–23)
CALCIUM SERPL-MCNC: 9.9 MG/DL — SIGNIFICANT CHANGE UP (ref 8.4–10.5)
CAST: 1 /LPF — SIGNIFICANT CHANGE UP (ref 0–4)
CHLORIDE SERPL-SCNC: 101 MMOL/L — SIGNIFICANT CHANGE UP (ref 98–107)
CO2 SERPL-SCNC: 22 MMOL/L — SIGNIFICANT CHANGE UP (ref 22–31)
COLOR SPEC: YELLOW — SIGNIFICANT CHANGE UP
COMMENT - URINE: SIGNIFICANT CHANGE UP
CREAT SERPL-MCNC: 0.81 MG/DL — SIGNIFICANT CHANGE UP (ref 0.5–1.3)
DIFF PNL FLD: ABNORMAL
EGFR: 93 ML/MIN/1.73M2 — SIGNIFICANT CHANGE UP
EOSINOPHIL # BLD AUTO: 0.1 K/UL — SIGNIFICANT CHANGE UP (ref 0–0.5)
EOSINOPHIL NFR BLD AUTO: 1.2 % — SIGNIFICANT CHANGE UP (ref 0–6)
GLUCOSE SERPL-MCNC: 96 MG/DL — SIGNIFICANT CHANGE UP (ref 70–99)
GLUCOSE UR QL: NEGATIVE MG/DL — SIGNIFICANT CHANGE UP
HCG SERPL-ACNC: SIGNIFICANT CHANGE UP MIU/ML
HCT VFR BLD CALC: 35.3 % — SIGNIFICANT CHANGE UP (ref 34.5–45)
HGB BLD-MCNC: 11.2 G/DL — LOW (ref 11.5–15.5)
IANC: 4.23 K/UL — SIGNIFICANT CHANGE UP (ref 1.8–7.4)
IMM GRANULOCYTES NFR BLD AUTO: 0.2 % — SIGNIFICANT CHANGE UP (ref 0–0.9)
KETONES UR-MCNC: ABNORMAL MG/DL
LEUKOCYTE ESTERASE UR-ACNC: NEGATIVE — SIGNIFICANT CHANGE UP
LYMPHOCYTES # BLD AUTO: 3.22 K/UL — SIGNIFICANT CHANGE UP (ref 1–3.3)
LYMPHOCYTES # BLD AUTO: 39.7 % — SIGNIFICANT CHANGE UP (ref 13–44)
MCHC RBC-ENTMCNC: 26.9 PG — LOW (ref 27–34)
MCHC RBC-ENTMCNC: 31.7 G/DL — LOW (ref 32–36)
MCV RBC AUTO: 84.9 FL — SIGNIFICANT CHANGE UP (ref 80–100)
MONOCYTES # BLD AUTO: 0.51 K/UL — SIGNIFICANT CHANGE UP (ref 0–0.9)
MONOCYTES NFR BLD AUTO: 6.3 % — SIGNIFICANT CHANGE UP (ref 2–14)
MUCOUS THREADS # UR AUTO: PRESENT
NEUTROPHILS # BLD AUTO: 4.23 K/UL — SIGNIFICANT CHANGE UP (ref 1.8–7.4)
NEUTROPHILS NFR BLD AUTO: 52.1 % — SIGNIFICANT CHANGE UP (ref 43–77)
NITRITE UR-MCNC: NEGATIVE — SIGNIFICANT CHANGE UP
NRBC # BLD: 0 /100 WBCS — SIGNIFICANT CHANGE UP (ref 0–0)
NRBC # FLD: 0 K/UL — SIGNIFICANT CHANGE UP (ref 0–0)
PH UR: 6 — SIGNIFICANT CHANGE UP (ref 5–8)
PLATELET # BLD AUTO: 303 K/UL — SIGNIFICANT CHANGE UP (ref 150–400)
POTASSIUM SERPL-MCNC: 3.6 MMOL/L — SIGNIFICANT CHANGE UP (ref 3.5–5.3)
POTASSIUM SERPL-SCNC: 3.6 MMOL/L — SIGNIFICANT CHANGE UP (ref 3.5–5.3)
PROT SERPL-MCNC: 7.3 G/DL — SIGNIFICANT CHANGE UP (ref 6–8.3)
PROT UR-MCNC: SIGNIFICANT CHANGE UP MG/DL
RBC # BLD: 4.16 M/UL — SIGNIFICANT CHANGE UP (ref 3.8–5.2)
RBC # FLD: 16 % — HIGH (ref 10.3–14.5)
RBC CASTS # UR COMP ASSIST: 7 /HPF — HIGH (ref 0–4)
REVIEW: SIGNIFICANT CHANGE UP
RH IG SCN BLD-IMP: POSITIVE — SIGNIFICANT CHANGE UP
SODIUM SERPL-SCNC: 135 MMOL/L — SIGNIFICANT CHANGE UP (ref 135–145)
SP GR SPEC: 1.03 — SIGNIFICANT CHANGE UP (ref 1–1.03)
SQUAMOUS # UR AUTO: 8 /HPF — HIGH (ref 0–5)
UROBILINOGEN FLD QL: 1 MG/DL — SIGNIFICANT CHANGE UP (ref 0.2–1)
WBC # BLD: 8.12 K/UL — SIGNIFICANT CHANGE UP (ref 3.8–10.5)
WBC # FLD AUTO: 8.12 K/UL — SIGNIFICANT CHANGE UP (ref 3.8–10.5)
WBC UR QL: 0 /HPF — SIGNIFICANT CHANGE UP (ref 0–5)

## 2024-11-16 PROCEDURE — 76817 TRANSVAGINAL US OBSTETRIC: CPT | Mod: 26

## 2024-11-16 RX ORDER — CEPHALEXIN 125 MG/5ML
500 SUSPENSION, RECONSTITUTED, ORAL (ML) ORAL ONCE
Refills: 0 | Status: COMPLETED | OUTPATIENT
Start: 2024-11-16 | End: 2024-11-16

## 2024-11-16 RX ORDER — CEPHALEXIN 125 MG/5ML
1 SUSPENSION, RECONSTITUTED, ORAL (ML) ORAL
Qty: 21 | Refills: 0
Start: 2024-11-16 | End: 2024-11-22

## 2024-11-16 RX ADMIN — Medication 500 MILLIGRAM(S): at 03:36

## 2024-11-16 NOTE — ED PROVIDER NOTE - PATIENT PORTAL LINK FT
You can access the FollowMyHealth Patient Portal offered by [facility] by registering at the following website: [email or web address]. By joining [employer]’s FollowMyHealth portal, you will also be able to view your health information using other applications (apps) compatible with our system.

## 2024-11-16 NOTE — ED PROVIDER NOTE - OBJECTIVE STATEMENT
Patient is a 39-year-old female G2 Approximately 6 weeks pregnant who presented to ED for repeat beta check.  Patient also reports noticing some vaginal spotting today only while wiping.  Patient reports her ultrasound from a few days ago was inconclusive.  Patient otherwise denies fevers, chills, chest pain, palpitations, shortness of breath, nausea, vomiting.

## 2024-11-16 NOTE — ED PROVIDER NOTE - NSFOLLOWUPINSTRUCTIONS_ED_ALL_ED_FT
You were seen in the emergency room. Your hcg level is rising appropriately and your ultrasound confirmed an intrauterine pregnancy. You have a subchorionic hematoma, please avoid any heavy lifting or sexual activity. If your urine culture is + for infection you will be started on antibiotics. It is important to follow up with OBGYN, Return to ER if any worsening bleeding, passing large clots, bleeding through >1 pad per hour, fevers, chills or any other concerns. You no longer need to follow on beta list.    Subchorionic Hematoma    A hematoma is a collection of blood outside of the blood vessels. A subchorionic hematoma is a collection of blood between the outer wall of the embryo (chorion) and the inner wall of the uterus.    This condition can cause vaginal bleeding. Early small hematomas usually shrink on their own and do not affect your baby or pregnancy. When bleeding starts later in pregnancy, or if the hematoma is larger or occurs in older pregnant women, the condition may be more serious. Larger hematomas increase the chances of miscarriage. This condition also increases the risk of:  Premature separation of the placenta from the uterus.  Premature () labor.  Stillbirth.  What are the causes?  The exact cause of this condition is not known. It occurs when blood is trapped between the placenta and the uterine wall because the placenta has  from the original site of implantation.    What increases the risk?  You are more likely to develop this condition if:  You were treated with fertility medicines.  You became pregnant through in vitro fertilization (IVF).  What are the signs or symptoms?  Symptoms of this condition include:  Vaginal spotting or bleeding.  Abdominal pain. This is rare.  Sometimes you may have no symptoms and the bleeding may only be seen when ultrasound images are taken (transvaginal ultrasound).    How is this diagnosed?  This condition is diagnosed based on a physical exam. This includes a pelvic exam. You may also have other tests, including:  Blood tests.  Urine tests.  Ultrasound of the abdomen.  How is this treated?  Treatment for this condition can vary. Treatment may include:  Watchful waiting. You will be monitored closely for any changes in bleeding.  Medicines.  Activity restriction. This may be needed until the bleeding stops.  A medicine called Rh immunoglobulin. This is given if you have an Rh-negative blood type. It prevents Rh sensitization.  Follow these instructions at home:  Stay on bed rest if told to do so by your health care provider.  Do not lift anything that is heavier than 10 lb (4.5 kg), or the limit that you are told by your health care provider.  Track and write down the number of pads you use each day and how soaked (saturated) they are.  Do not use tampons.  Keep all follow-up visits. This is important. Your health care provider may ask you to have follow-up blood tests or ultrasound tests or both.  Contact a health care provider if:  You have any vaginal bleeding.  You have a fever.  Get help right away if:  You have severe cramps in your stomach, back, abdomen, or pelvis.  You pass large clots or tissue. Save any tissue for your health care provider to look at.  You faint.  You become light-headed or weak.  Summary  A subchorionic hematoma is a collection of blood between the outer wall of the embryo (chorion) and the inner wall of the uterus.  This condition can cause vaginal bleeding.  Sometimes you may have no symptoms and the bleeding may only be seen when ultrasound images are taken.  Treatment may include watchful waiting, medicines, or activity restriction.  Keep all follow-up visits. Get help right away if you have severe cramps or heavy vaginal bleeding.

## 2024-11-16 NOTE — ED PROVIDER NOTE - PROGRESS NOTE DETAILS
BREE Munoz: pt beta rising, TVUS showing IUP compatible with dates, + subchorionic hematoma which pt made aware of and precautions given  discussed with OBGYN reports no need for eval and does not have to continue with beta list at this time  pt reports she is in process of establishing OBGYN care, does not need referral at this time, UA with some bacteria but with + epithelial cells, pt reports no symptoms, wants to wait for culture results  strict return precautions discussed

## 2024-11-16 NOTE — ED PROVIDER NOTE - CLINICAL SUMMARY MEDICAL DECISION MAKING FREE TEXT BOX
Patient is a 39-year-old female G2 Approximately 6 weeks pregnant who presented to ED for repeat beta check.  Pt also endorsing vaginal spotting today. Plan for labs, beta, TVUS and will reassess.

## 2024-11-16 NOTE — ED ADULT NURSE NOTE - OBJECTIVE STATEMENT
39 female brought to room 8. pt complains of vaginal spotting @ 2130 approx. 6 weeks pregnant not confirmed by US . pt past medical hx fibroids  .  patient laying in semi fowlers position on the stretcher. patient alert and oriented times four. patient denies shortness of breath, chest pain, nausea, vomiting, chill, fever. Respirations equal and adequate. Patients IV patent, no signs of infiltration. Safety measures in place, call bell within reach. Patient stable upon leaving the room.

## 2024-11-16 NOTE — ED ADULT NURSE NOTE - NS ED NURSE RECORD ANOTHER HT AND WT
ASCUS, HPV neg.    Discussed with Dr. Branch.  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   No

## 2024-11-16 NOTE — ED ADULT NURSE NOTE - NSFALLUNIVINTERV_ED_ALL_ED
Bed/Stretcher in lowest position, wheels locked, appropriate side rails in place/Call bell, personal items and telephone in reach/Instruct patient to call for assistance before getting out of bed/chair/stretcher/Non-slip footwear applied when patient is off stretcher/Jackpot to call system/Physically safe environment - no spills, clutter or unnecessary equipment/Purposeful proactive rounding/Room/bathroom lighting operational, light cord in reach

## 2024-11-17 LAB
CULTURE RESULTS: SIGNIFICANT CHANGE UP
SPECIMEN SOURCE: SIGNIFICANT CHANGE UP